# Patient Record
Sex: MALE | Race: BLACK OR AFRICAN AMERICAN
[De-identification: names, ages, dates, MRNs, and addresses within clinical notes are randomized per-mention and may not be internally consistent; named-entity substitution may affect disease eponyms.]

---

## 2019-03-07 PROCEDURE — HZ2ZZZZ DETOXIFICATION SERVICES FOR SUBSTANCE ABUSE TREATMENT: ICD-10-PCS | Performed by: SURGERY

## 2019-03-08 ENCOUNTER — HOSPITAL ENCOUNTER (INPATIENT)
Dept: HOSPITAL 74 - YASAS | Age: 64
LOS: 4 days | Discharge: OTHER INSTITUTION | End: 2019-03-12
Attending: SURGERY | Admitting: SURGERY
Payer: COMMERCIAL

## 2019-03-08 VITALS — BODY MASS INDEX: 29.1 KG/M2

## 2019-03-08 DIAGNOSIS — Z86.19: ICD-10-CM

## 2019-03-08 DIAGNOSIS — F17.210: ICD-10-CM

## 2019-03-08 DIAGNOSIS — I10: ICD-10-CM

## 2019-03-08 DIAGNOSIS — E87.5: ICD-10-CM

## 2019-03-08 DIAGNOSIS — F10.230: Primary | ICD-10-CM

## 2019-03-08 DIAGNOSIS — D72.828: ICD-10-CM

## 2019-03-09 LAB
ALBUMIN SERPL-MCNC: 3.4 G/DL (ref 3.4–5)
ALP SERPL-CCNC: 72 U/L (ref 45–117)
ALT SERPL-CCNC: 20 U/L (ref 13–61)
ANION GAP SERPL CALC-SCNC: 7 MMOL/L (ref 8–16)
AST SERPL-CCNC: 17 U/L (ref 15–37)
BILIRUB SERPL-MCNC: 0.3 MG/DL (ref 0.2–1)
BUN SERPL-MCNC: 24 MG/DL (ref 7–18)
CALCIUM SERPL-MCNC: 8.9 MG/DL (ref 8.5–10.1)
CHLORIDE SERPL-SCNC: 103 MMOL/L (ref 98–107)
CO2 SERPL-SCNC: 27 MMOL/L (ref 21–32)
CREAT SERPL-MCNC: 1.1 MG/DL (ref 0.55–1.3)
DEPRECATED RDW RBC AUTO: 18.3 % (ref 11.9–15.9)
GLUCOSE SERPL-MCNC: 64 MG/DL (ref 74–106)
HCT VFR BLD CALC: 36.3 % (ref 35.4–49)
HGB BLD-MCNC: 12.5 GM/DL (ref 11.7–16.9)
MCH RBC QN AUTO: 24.7 PG (ref 25.7–33.7)
MCHC RBC AUTO-ENTMCNC: 34.4 G/DL (ref 32–35.9)
MCV RBC: 71.8 FL (ref 80–96)
PLATELET # BLD AUTO: 353 K/MM3 (ref 134–434)
PMV BLD: 8 FL (ref 7.5–11.1)
POTASSIUM SERPLBLD-SCNC: 5.5 MMOL/L (ref 3.5–5.1)
PROT SERPL-MCNC: 8.2 G/DL (ref 6.4–8.2)
RBC # BLD AUTO: 5.06 M/MM3 (ref 4–5.6)
SODIUM SERPL-SCNC: 137 MMOL/L (ref 136–145)
WBC # BLD AUTO: 11.7 K/MM3 (ref 4–10)

## 2019-03-09 RX ADMIN — PHENOL PRN EACH: 14.5 LOZENGE ORAL at 13:41

## 2019-03-09 RX ADMIN — Medication SCH TAB: at 10:47

## 2019-03-09 RX ADMIN — NICOTINE SCH: 14 PATCH, EXTENDED RELEASE TRANSDERMAL at 10:48

## 2019-03-09 RX ADMIN — Medication SCH: at 23:06

## 2019-03-09 RX ADMIN — IBUPROFEN PRN MG: 400 TABLET, FILM COATED ORAL at 13:41

## 2019-03-09 NOTE — PN
S CIWA





- CIWA Score


Nausea/Vomitin


Muscle Tremors: None


Anxiety: 2


Agitation: 0-Normal Activity


Paroxysmal Sweats: 3


Orientation: 0-Oriented


Tacttile Disturbances: 3-Moderate Itch/Numb/Burn


Auditory Disturbances: 0-None


Visual Disturbances: 2-Mild Sensitivity


Headache: 0-None Present


CIWA-Ar Total Score: 12





BHS Progress Note (SOAP)


Subjective: 





Anxious, Sweating, Nausea, Fatigue.


Objective: 


PATIENT A & O X 3. IN NO ACUTE DISTRESS.


PATIENT AFEBRILE.





19 14:42


 Vital Signs











Temperature  97.8 F   19 13:45


 


Pulse Rate  56 L  19 13:45


 


Respiratory Rate  18   19 13:45


 


Blood Pressure  98/61   19 13:45


 


O2 Sat by Pulse Oximetry (%)      








 Laboratory Tests











  19





  07:40 07:40


 


WBC  11.7 H 


 


RBC  5.06 


 


Hgb  12.5 


 


Hct  36.3 


 


MCV  71.8 L 


 


MCH  24.7 L 


 


MCHC  34.4 


 


RDW  18.3 H 


 


Plt Count  353  D 


 


MPV  8.0 


 


Sodium   137


 


Potassium   5.5 H


 


Chloride   103


 


Carbon Dioxide   27


 


Anion Gap   7 L


 


BUN   24 H


 


Creatinine   1.1


 


Creat Clearance w eGFR   > 60


 


Random Glucose   64 L


 


Calcium   8.9


 


Total Bilirubin   0.3


 


AST   17


 


ALT   20


 


Alkaline Phosphatase   72


 


Total Protein   8.2


 


Albumin   3.4








LABS NOTED.


RPR RESULT PENDING.





19 14:47





Assessment: 





19 14:47


WITHDRAWAL SYMPTOMS.


HYPERKALEMIA.


LEUKOPCYTOSIS.





Plan: 





CONTINUE DETOX.


INCREASE DAILY PO FLUID INTAKE.


REPEAT CBC LEVEL TOMORROW AM FOR ELEVATED ADMISSION WBC LEVEL.


REPEAT K LEVEL TOMORROW AM FOR ELEVATED ADMISSION LEVEL.

## 2019-03-09 NOTE — HP
CIWA Score


Nausea/Vomitin-Mild Nausea/No Vomiting (hx/o htn with worsening elevated b/

p when withdrawing.presentss with arsen of .076. client unable to stop drinking 

due to withdrawal sx's. cont to drink not to withdraw)


Muscle Tremors: None


Anxiety: 4-Mod. Anxious/Guarded


Agitation: 4-Moderately Restless


Paroxysmal Sweats: No Perspiration


Orientation: 0-Oriented


Tacttile Disturbances: 0-None


Auditory Disturbances: 0-None


Visual Disturbances: 0-None


Headache: 0-None Present


CIWA-Ar Total Score: 9





- Admission Criteria


[facility] Guidelines: Admission for Medically Managed Detox: 


Requires at least one of the followin. CIWA greater than 12


2. Seizures within the past 24 hours


3. Delirium tremens within the past 24 hours


4. Hallucinations within the past 24 hours


5. Acute intervention needed for co  occurring medical disorder


6. Acute intervention needed for co  occurring psychiatric disorder


7. Severe withdrawal that cannot be handled at a lower level of care (continued


    vomiting, continued diarrhea, abnormal vital signs) requiring intravenous


    medication and/or fluids


8. Pregnancy





Patient presents the following: Acute intervention needed for co-occurring med 

or psych disorder (arsen .076 client continues to drink due to withdrawal sx's.)


Admission Criteria Met: Admission criteria met





Admission ROS [facility]





- [facility]


Chief Complaint: 





c/o worsening withdrawal sx's. seeking detox txment.


Allergies/Adverse Reactions: 


 Allergies











Allergy/AdvReac Type Severity Reaction Status Date / Time


 


No Known Allergies Allergy   Verified 19 23:37











History of Present Illness: 





65 y.o. male with hx/o alcoholism here seeking admission to detox. client is 

self referred as he is known to this program. He reports a recent detox at 

[facility] 10 days ago. he states he immediately relapsed after ID. 

drinking 6 pack of beer, 20 oz bottles daily. reports last drink earlier today 

due to worsening withdrawal sx's. He also states he experience very high blood 

pressure when withdrawing. ciwa 09. utox + bzo and hx/o htn. Denies any given 

period of sobriety denies hx/o seizures, si/hi, avh. lives with sibling, ssi- 

denies legals





pmhx= htn


psych- denies 


Exam Limitations: No Limitations





- Ebola screening


Have you traveled outside of the country in the last 21 days: No


Have you had contact with anyone from an Ebola affected area: No


Have you been sick,other than usual withdrawal symptoms: No


Do you have a fever: No





- Review of Systems


Constitutional: Loss of Appetite


EENT: reports: No Symptoms Reported


Respiratory: reports: No Symptoms reported


Cardiac: reports: No Symptoms Reported


GI: reports: Nausea, Poor Fluid Intake


: reports: No Symptoms Reported


Musculoskeletal: reports: No Symptoms Reported


Integumentary: reports: No Symptoms Reported


Neuro: reports: No Symptoms reported


Endocrine: reports: No Symptoms Reported


Hematology: reports: No Symptoms Reported


Psychiatric: reports: No Sypmtoms Reported


Other Systems: Reviewed and Negative





Patient History





- Patient Medical History


Hx Anemia: No


Hx Asthma: No


Hx Chronic Obstructive Pulmonary Disease (COPD): No


Hx Cancer: No


Hx Cardiac Disorders: No


Hx Congestive Heart Failure: No


Hx Hypertension: Yes (norvasc 5 mg)


Hx Hypercholesterolemia: No


Hx Pacemaker: No


HX Cerebrovascular Accident: No


Hx Seizures: No


Hx Dementia: No


Hx Diabetes: No


Hx Gastrointestinal Disorders: No


Hx Liver Disease: No


Hx Genitourinary Disorders: No


Hx Sexually Transmitted Disorders: Yes (GC in )


Hx Renal Disease (ESRD): No


Hx Thyroid Disease: No


Hx Human Immunodeficiency Virus (HIV): No


Hx Hepatitis C: No


Hx Depression: No


Hx Suicide Attempt: No


Hx Bipolar Disorder: No


Hx Schizophrenia: No





- Patient Surgical History


Past Surgical History: No


Hx Neurologic Surgery: No


Hx Cataract Extraction: No


Hx Cardiac Surgery: No


Hx Lung Surgery: No


Hx Breast Surgery: No


Hx Breast Biopsy: No


Hx Abdominal Surgery: No


Hx Appendectomy: No


Hx Cholecystectomy: No


Hx Genitourinary Surgery: No


Hx  Section: No


Hx Orthopedic Surgery: No


Anesthesia Reaction: No





- PPD History


Previous Implant?: Yes


Documented Results: Negative w/proof


Implanted On Prior R Admission?: Yes


Date: 17


Results: 0 mm


PPD to be Administered?: Yes





- Smoking Cessation


Smoking history: Current every day smoker


Have you smoked in the past 12 months: Yes


Aproximately how many cigarettes per day: 20


Cigars Per Day: 0


Hx Chewing Tobacco Use: No


Initiated information on smoking cessation: Yes


'Breaking Loose' booklet given: 19





- Substance & Tx. History


Hx Alcohol Use: Yes


Hx Substance Use: Yes


Substance Use Type: Alcohol


Hx Substance Use Treatment: Yes ([facility])





- Substances Abused


  ** Alcohol


Route: Oral


Frequency: Daily


Amount used: beer- 2 six pack-20 oz 


Age of first use: 20


Date of Last Use: 20





Family Disease History





- Family Disease History


Family Disease History: Diabetes: Brother (drug issues ), Heart Disease: Mother 

(aneurysm,HTN ), Respiratory: Father (emphysema/ALCOHOLIC ), 

Other: Mother, Brother





Admission Physical Exam BHS





- Vital Signs


Vital Signs: 


 Vital Signs - 24 hr











  19





  22:45


 


Temperature 96.8 F L


 


Pulse Rate 62


 


Respiratory 18





Rate 


 


Blood Pressure 130/83














- Physical


General Appearance: Yes: Appropriately Dressed, Mild Distress, Tremorous (felt)

, Irritable, Anxious


HEENTM: Yes: EOMI, Normocephalic, Normal Voice, RAGHU, Pharynx Normal


Respiratory: Yes: Chest Non-Tender, Lungs Clear, Normal Breath Sounds, No 

Respiratory Distress, No Accessory Muscle Use


Neck: Yes: No masses,lesions,Nodules, Supple, Trachea in good position


Breast: Yes: Breast Exam Deferred


Cardiology: Yes: Regular Rhythm, Regular Rate, S1, S2


Abdominal: Yes: Normal Bowel Sounds, Non Tender, Soft, Protuberent


Genitourinary: Yes: Other (no c/o offered)


Back: Yes: Normal Inspection


Musculoskeletal: Yes: full range of Motion, Gait Steady


Extremities: Yes: Normal Capillary Refill, Normal Range of Motion, Non-Tender, 

Tremors (felt)


Neurological: Yes: Fully Oriented, Alert, Motor Strength 5/5


Integumentary: Yes: Dry, Warm


Lymphatic: Yes: Within Normal Limits





- Diagnostic


(1) Alcohol dependence with uncomplicated withdrawal


Current Visit: Yes   Status: Acute   





(2) Essential (primary) hypertension


Current Visit: Yes   Status: Chronic   





(3) Nicotine dependence


Current Visit: Yes   Status: Chronic   


Qualifiers: 


   Nicotine product type: cigarettes   Substance use status: in withdrawal   

Qualified Code(s): F17.213 - Nicotine dependence, cigarettes, with withdrawal   





Cleared for Admission [facility]





- Detox or Rehab


[facility] Level of Care: Medically Managed


Detox Regimen/Protocol: Librium


Claeared for Rehab Admission: No





S Breath Alcohol Content


Breath Alcohol Content: 0.076





Urine Drug Screen





- Results


Drug Screen Negative: No


Urine Drug Screen Results: BZO-Benzodiazepines





Inpatient Rehab Admission





- Rehab Decision to Admit


Inpatient rehab admission?: No

## 2019-03-10 LAB
APPEARANCE UR: CLEAR
BACTERIA #/AREA URNS HPF: (no result) /HPF
BILIRUB UR STRIP.AUTO-MCNC: NEGATIVE MG/DL
COLOR UR: (no result)
KETONES UR QL STRIP: NEGATIVE
LEUKOCYTE ESTERASE UR QL STRIP.AUTO: (no result)
NITRITE UR QL STRIP: NEGATIVE
PH UR: 6 [PH] (ref 5–8)
PROT UR QL STRIP: NEGATIVE
PROT UR QL STRIP: NEGATIVE
SP GR UR: 1.02 (ref 1.01–1.03)
UROBILINOGEN UR STRIP-MCNC: NEGATIVE MG/DL (ref 0.2–1)

## 2019-03-10 RX ADMIN — PHENOL PRN EACH: 14.5 LOZENGE ORAL at 05:43

## 2019-03-10 RX ADMIN — Medication SCH TAB: at 10:22

## 2019-03-10 RX ADMIN — IBUPROFEN PRN MG: 400 TABLET, FILM COATED ORAL at 20:15

## 2019-03-10 RX ADMIN — Medication SCH MG: at 22:30

## 2019-03-10 RX ADMIN — ACETAMINOPHEN PRN MG: 325 TABLET ORAL at 10:23

## 2019-03-10 RX ADMIN — NICOTINE SCH: 14 PATCH, EXTENDED RELEASE TRANSDERMAL at 10:22

## 2019-03-10 RX ADMIN — ACETAMINOPHEN PRN MG: 325 TABLET ORAL at 17:28

## 2019-03-10 NOTE — PN
S CIWA





- CIWA Score


Nausea/Vomitin-No Nausea/No Vomiting


Muscle Tremors: 2


Anxiety: 1-Mildly Anxious


Agitation: 2


Paroxysmal Sweats: 1-Minimal Palms Moist


Orientation: 1-Uncertain about Date


Tacttile Disturbances: 0-None


Auditory Disturbances: 0-None


Visual Disturbances: 0-None


Headache: 1-Very Mild


CIWA-Ar Total Score: 8





BHS Progress Note (SOAP)


Subjective: 





tremor sweating anxiousness trouble concentration


Objective: 





03/10/19 12:03


 Vital Signs











Temperature  97.8 F   03/10/19 09:26


 


Pulse Rate  70   03/10/19 09:26


 


Respiratory Rate  18   03/10/19 09:26


 


Blood Pressure  103/61   03/10/19 09:26


 


O2 Sat by Pulse Oximetry (%)      








 Laboratory Last Values











WBC  11.7 K/mm3 (4.0-10.0)  H  19  07:40    


 


RBC  5.06 M/mm3 (4.00-5.60)   19  07:40    


 


Hgb  12.5 GM/dL (11.7-16.9)   19  07:40    


 


Hct  36.3 % (35.4-49)   19  07:40    


 


MCV  71.8 fl (80-96)  L  19  07:40    


 


MCH  24.7 pg (25.7-33.7)  L  19  07:40    


 


MCHC  34.4 g/dl (32.0-35.9)   19  07:40    


 


RDW  18.3 % (11.9-15.9)  H  19  07:40    


 


Plt Count  353 K/MM3 (134-434)  D 19  07:40    


 


MPV  8.0 fl (7.5-11.1)   19  07:40    


 


Sodium  137 mmol/L (136-145)   19  07:40    


 


Potassium  5.5 mmol/L (3.5-5.1)  H  19  07:40    


 


Chloride  103 mmol/L ()   19  07:40    


 


Carbon Dioxide  27 mmol/L (21-32)   19  07:40    


 


Anion Gap  7 MMOL/L (8-16)  L  19  07:40    


 


BUN  24 mg/dL (7-18)  H  19  07:40    


 


Creatinine  1.1 mg/dL (0.55-1.3)   19  07:40    


 


Creat Clearance w eGFR  > 60  (>60)   19  07:40    


 


Random Glucose  64 mg/dL ()  L  19  07:40    


 


Calcium  8.9 mg/dL (8.5-10.1)   19  07:40    


 


Total Bilirubin  0.3 mg/dL (0.2-1)   19  07:40    


 


AST  17 U/L (15-37)   19  07:40    


 


ALT  20 U/L (13-61)   19  07:40    


 


Alkaline Phosphatase  72 U/L ()   19  07:40    


 


Total Protein  8.2 g/dl (6.4-8.2)   19  07:40    


 


Albumin  3.4 g/dl (3.4-5.0)   19  07:40    


 


Urine Color  Ltyellow   03/10/19  08:00    


 


Urine Appearance  Clear   03/10/19  08:00    


 


Urine pH  6.0  (5.0-8.0)   03/10/19  08:00    


 


Ur Specific Gravity  1.016  (1.010-1.035)   03/10/19  08:00    


 


Urine Protein  Negative  (NEGATIVE)   03/10/19  08:00    


 


Urine Glucose (UA)  Negative  (NEGATIVE)   03/10/19  08:00    


 


Urine Ketones  Negative  (NEGATIVE)   03/10/19  08:00    


 


Urine Blood  Negative  (NEGATIVE)   03/10/19  08:00    


 


Urine Nitrite  Negative  (NEGATIVE)   03/10/19  08:00    


 


Urine Bilirubin  Negative  (<2.0 mg/dL)   03/10/19  08:00    


 


Urine Urobilinogen  Negative mg/dL (0.2-1.0)   03/10/19  08:00    


 


Ur Leukocyte Esterase  2+  (NEGATIVE)  H  03/10/19  08:00    


 


Urine WBC (Auto)  26 /hpf (3-5)   03/10/19  08:00    


 


Urine RBC (Auto)  2 /hpf (0-3)   03/10/19  08:00    


 


Urine Bacteria  Few /hpf (NONE SEEN)   03/10/19  08:00    


 


RPR Titer  Nonreactive  (NONREACTIVE)   19  07:40    








lab noted


repeat K+


03/10/19 12:05





Assessment: 





03/10/19 12:06


withdrawal sx


03/10/19 12:06


K+ elevation 


Plan: 





continue detox


repeat K+

## 2019-03-11 RX ADMIN — NICOTINE SCH: 14 PATCH, EXTENDED RELEASE TRANSDERMAL at 10:18

## 2019-03-11 RX ADMIN — Medication SCH MG: at 22:23

## 2019-03-11 RX ADMIN — Medication SCH TAB: at 10:20

## 2019-03-11 RX ADMIN — IBUPROFEN PRN MG: 400 TABLET, FILM COATED ORAL at 08:37

## 2019-03-11 NOTE — PN
S CIWA





- CIWA Score


Nausea/Vomitin-No Nausea/No Vomiting


Muscle Tremors: 1-None Visible, but Felt


Anxiety: 1-Mildly Anxious


Agitation: 1-Slight > Activity


Paroxysmal Sweats: No Perspiration


Orientation: 0-Oriented


Tacttile Disturbances: 0-None


Auditory Disturbances: 0-None


Visual Disturbances: 0-None


Headache: 1-Very Mild


CIWA-Ar Total Score: 4





BHS Progress Note (SOAP)


Subjective: 





feeling better less tremor sleep better at night less sweating


Objective: 





19 15:07


 Vital Signs











Temperature  98.0 F   19 13:34


 


Pulse Rate  57 L  19 13:34


 


Respiratory Rate  18   19 13:34


 


Blood Pressure  113/74   19 13:34


 


O2 Sat by Pulse Oximetry (%)      








 Laboratory Last Values











WBC  11.7 K/mm3 (4.0-10.0)  H  19  07:40    


 


RBC  5.06 M/mm3 (4.00-5.60)   19  07:40    


 


Hgb  12.5 GM/dL (11.7-16.9)   19  07:40    


 


Hct  36.3 % (35.4-49)   19  07:40    


 


MCV  71.8 fl (80-96)  L  19  07:40    


 


MCH  24.7 pg (25.7-33.7)  L  19  07:40    


 


MCHC  34.4 g/dl (32.0-35.9)   19  07:40    


 


RDW  18.3 % (11.9-15.9)  H  19  07:40    


 


Plt Count  353 K/MM3 (134-434)  D 19  07:40    


 


MPV  8.0 fl (7.5-11.1)   19  07:40    


 


Sodium  137 mmol/L (136-145)   19  07:40    


 


Potassium  5.5 mmol/L (3.5-5.1)  H  19  07:40    


 


Chloride  103 mmol/L ()   19  07:40    


 


Carbon Dioxide  27 mmol/L (21-32)   19  07:40    


 


Anion Gap  7 MMOL/L (8-16)  L  19  07:40    


 


BUN  24 mg/dL (7-18)  H  19  07:40    


 


Creatinine  1.1 mg/dL (0.55-1.3)   19  07:40    


 


Creat Clearance w eGFR  > 60  (>60)   19  07:40    


 


Random Glucose  64 mg/dL ()  L  19  07:40    


 


Calcium  8.9 mg/dL (8.5-10.1)   19  07:40    


 


Total Bilirubin  0.3 mg/dL (0.2-1)   19  07:40    


 


AST  17 U/L (15-37)   19  07:40    


 


ALT  20 U/L (13-61)   19  07:40    


 


Alkaline Phosphatase  72 U/L ()   19  07:40    


 


Total Protein  8.2 g/dl (6.4-8.2)   19  07:40    


 


Albumin  3.4 g/dl (3.4-5.0)   19  07:40    


 


Urine Color  Ltyellow   03/10/19  08:00    


 


Urine Appearance  Clear   03/10/19  08:00    


 


Urine pH  6.0  (5.0-8.0)   03/10/19  08:00    


 


Ur Specific Gravity  1.016  (1.010-1.035)   03/10/19  08:00    


 


Urine Protein  Negative  (NEGATIVE)   03/10/19  08:00    


 


Urine Glucose (UA)  Negative  (NEGATIVE)   03/10/19  08:00    


 


Urine Ketones  Negative  (NEGATIVE)   03/10/19  08:00    


 


Urine Blood  Negative  (NEGATIVE)   03/10/19  08:00    


 


Urine Nitrite  Negative  (NEGATIVE)   03/10/19  08:00    


 


Urine Bilirubin  Negative  (<2.0 mg/dL)   03/10/19  08:00    


 


Urine Urobilinogen  Negative mg/dL (0.2-1.0)   03/10/19  08:00    


 


Ur Leukocyte Esterase  2+  (NEGATIVE)  H  03/10/19  08:00    


 


Urine WBC (Auto)  26 /hpf (3-5)   03/10/19  08:00    


 


Urine RBC (Auto)  2 /hpf (0-3)   03/10/19  08:00    


 


Urine Bacteria  Few /hpf (NONE SEEN)   03/10/19  08:00    


 


RPR Titer  Nonreactive  (NONREACTIVE)   19  07:40    











19 15:08


lab noted


hyper kalemia 


repeat K+


Assessment: 





19 15:09


withdrawal sx


Plan: 





continue detox

## 2019-03-12 ENCOUNTER — HOSPITAL ENCOUNTER (INPATIENT)
Dept: HOSPITAL 74 - YASAS | Age: 64
LOS: 13 days | Discharge: HOME | DRG: 772 | End: 2019-03-25
Attending: NEUROMUSCULOSKELETAL MEDICINE & OMM | Admitting: NEUROMUSCULOSKELETAL MEDICINE & OMM
Payer: COMMERCIAL

## 2019-03-12 VITALS — TEMPERATURE: 96.8 F | SYSTOLIC BLOOD PRESSURE: 111 MMHG | HEART RATE: 66 BPM | DIASTOLIC BLOOD PRESSURE: 66 MMHG

## 2019-03-12 DIAGNOSIS — F10.20: Primary | ICD-10-CM

## 2019-03-12 DIAGNOSIS — F14.20: ICD-10-CM

## 2019-03-12 LAB
APPEARANCE UR: CLEAR
BILIRUB UR STRIP.AUTO-MCNC: NEGATIVE MG/DL
COLOR UR: YELLOW
KETONES UR QL STRIP: NEGATIVE
LEUKOCYTE ESTERASE UR QL STRIP.AUTO: (no result)
NITRITE UR QL STRIP: NEGATIVE
PH UR: 5 [PH] (ref 5–8)
PROT UR QL STRIP: NEGATIVE
PROT UR QL STRIP: NEGATIVE
SP GR UR: 1.03 (ref 1.01–1.03)
UROBILINOGEN UR STRIP-MCNC: NEGATIVE MG/DL (ref 0.2–1)

## 2019-03-12 PROCEDURE — HZ42ZZZ GROUP COUNSELING FOR SUBSTANCE ABUSE TREATMENT, COGNITIVE-BEHAVIORAL: ICD-10-PCS | Performed by: NEUROMUSCULOSKELETAL MEDICINE & OMM

## 2019-03-12 RX ADMIN — NICOTINE SCH: 14 PATCH, EXTENDED RELEASE TRANSDERMAL at 10:04

## 2019-03-12 RX ADMIN — IBUPROFEN PRN MG: 400 TABLET, FILM COATED ORAL at 05:46

## 2019-03-12 RX ADMIN — ACETAMINOPHEN PRN MG: 325 TABLET ORAL at 09:01

## 2019-03-12 RX ADMIN — Medication SCH TAB: at 10:04

## 2019-03-12 RX ADMIN — Medication SCH: at 21:45

## 2019-03-12 NOTE — HP
BHS MD Rehab Assess/Revision





- Admission History


Ad[facility] to Rehab from[facility]


Date of Admission to Rehab: 03/12/19





- Vital signs


Vital Signs: 


 Vital Signs











 Period  Temp  Pulse  Resp  BP Sys/Russ  Pulse Ox


 


 Last 24 Hr  97.9 F  87  18  126/70  














- Findings


Detox History & Physical reviewed: Yes


Concur with findings: Yes


Comments/Additional Findings: transferred from detox to rehab admission as per 

protocol





Inpatient Rehab Admission





- Rehab Decision to Admit


Inpatient rehab admission?: Yes





- Initial Determination


Are CD services needed?: Yes


Free of communicable disease: Yes


Not in need of hospitalization: Yes





- Rehab Admission Criteria


Previous failed treatment: Yes


Poor recovery environment: Yes


Comorbidities: Yes


Lacks judgement: No


Patient is meeting Inpatient Rehab admission criteria:: Yes

## 2019-03-13 RX ADMIN — Medication PRN MG: at 21:51

## 2019-03-13 RX ADMIN — PHENOL PRN EACH: 14.5 LOZENGE ORAL at 06:55

## 2019-03-13 RX ADMIN — GUAIFENESIN PRN ML: 100 SOLUTION ORAL at 06:59

## 2019-03-13 RX ADMIN — GUAIFENESIN PRN ML: 100 SOLUTION ORAL at 16:28

## 2019-03-13 RX ADMIN — Medication SCH TAB: at 10:34

## 2019-03-13 RX ADMIN — IBUPROFEN PRN MG: 400 TABLET, FILM COATED ORAL at 15:18

## 2019-03-13 RX ADMIN — Medication SCH MG: at 21:51

## 2019-03-14 RX ADMIN — Medication SCH MG: at 21:02

## 2019-03-14 RX ADMIN — PHENOL PRN EACH: 14.5 LOZENGE ORAL at 09:54

## 2019-03-14 RX ADMIN — Medication PRN MG: at 21:02

## 2019-03-14 RX ADMIN — GUAIFENESIN PRN ML: 100 SOLUTION ORAL at 09:54

## 2019-03-14 RX ADMIN — Medication SCH TAB: at 09:53

## 2019-03-15 RX ADMIN — Medication PRN MG: at 21:19

## 2019-03-15 RX ADMIN — PHENOL PRN EACH: 14.5 LOZENGE ORAL at 21:20

## 2019-03-15 RX ADMIN — IBUPROFEN PRN MG: 400 TABLET, FILM COATED ORAL at 14:31

## 2019-03-15 RX ADMIN — Medication SCH MG: at 21:19

## 2019-03-15 RX ADMIN — GUAIFENESIN PRN ML: 100 SOLUTION ORAL at 21:19

## 2019-03-15 RX ADMIN — Medication SCH TAB: at 10:19

## 2019-03-15 RX ADMIN — PHENOL PRN EACH: 14.5 LOZENGE ORAL at 14:29

## 2019-03-16 RX ADMIN — GUAIFENESIN PRN ML: 100 SOLUTION ORAL at 15:12

## 2019-03-16 RX ADMIN — Medication PRN MG: at 21:53

## 2019-03-16 RX ADMIN — PHENOL PRN EACH: 14.5 LOZENGE ORAL at 15:12

## 2019-03-16 RX ADMIN — Medication SCH MG: at 21:53

## 2019-03-16 RX ADMIN — Medication SCH TAB: at 09:53

## 2019-03-16 RX ADMIN — GUAIFENESIN PRN ML: 100 SOLUTION ORAL at 21:59

## 2019-03-17 RX ADMIN — ACETAMINOPHEN PRN MG: 325 TABLET ORAL at 11:04

## 2019-03-17 RX ADMIN — ACETAMINOPHEN PRN MG: 325 TABLET ORAL at 17:35

## 2019-03-17 RX ADMIN — GUAIFENESIN PRN ML: 100 SOLUTION ORAL at 06:38

## 2019-03-17 RX ADMIN — PHENOL PRN EACH: 14.5 LOZENGE ORAL at 06:37

## 2019-03-17 RX ADMIN — Medication SCH: at 22:48

## 2019-03-17 RX ADMIN — Medication SCH TAB: at 10:19

## 2019-03-18 RX ADMIN — Medication SCH TAB: at 10:27

## 2019-03-18 RX ADMIN — Medication PRN MG: at 21:34

## 2019-03-18 RX ADMIN — Medication SCH MG: at 21:34

## 2019-03-19 RX ADMIN — IBUPROFEN PRN MG: 400 TABLET, FILM COATED ORAL at 17:02

## 2019-03-19 RX ADMIN — GUAIFENESIN PRN ML: 100 SOLUTION ORAL at 17:03

## 2019-03-19 RX ADMIN — Medication SCH MG: at 21:20

## 2019-03-19 RX ADMIN — Medication SCH TAB: at 10:48

## 2019-03-19 RX ADMIN — Medication PRN MG: at 21:20

## 2019-03-20 RX ADMIN — PHENOL PRN EACH: 14.5 LOZENGE ORAL at 11:34

## 2019-03-20 RX ADMIN — Medication SCH TAB: at 10:16

## 2019-03-20 RX ADMIN — Medication PRN MG: at 21:35

## 2019-03-20 RX ADMIN — Medication SCH MG: at 21:35

## 2019-03-20 RX ADMIN — GUAIFENESIN PRN ML: 100 SOLUTION ORAL at 11:35

## 2019-03-20 RX ADMIN — IBUPROFEN PRN MG: 400 TABLET, FILM COATED ORAL at 11:34

## 2019-03-21 RX ADMIN — Medication PRN MG: at 21:26

## 2019-03-21 RX ADMIN — Medication SCH TAB: at 09:46

## 2019-03-21 RX ADMIN — Medication SCH MG: at 21:26

## 2019-03-22 RX ADMIN — Medication SCH TAB: at 09:39

## 2019-03-22 RX ADMIN — Medication SCH MG: at 21:25

## 2019-03-22 RX ADMIN — IBUPROFEN PRN MG: 400 TABLET, FILM COATED ORAL at 10:01

## 2019-03-22 RX ADMIN — Medication PRN MG: at 21:25

## 2019-03-23 RX ADMIN — Medication PRN MG: at 21:11

## 2019-03-23 RX ADMIN — GUAIFENESIN PRN ML: 100 SOLUTION ORAL at 21:12

## 2019-03-23 RX ADMIN — Medication SCH TAB: at 09:49

## 2019-03-23 RX ADMIN — Medication SCH MG: at 21:11

## 2019-03-23 RX ADMIN — PHENOL PRN EACH: 14.5 LOZENGE ORAL at 21:12

## 2019-03-24 RX ADMIN — Medication SCH TAB: at 09:56

## 2019-03-24 RX ADMIN — Medication SCH MG: at 21:03

## 2019-03-24 RX ADMIN — Medication PRN MG: at 21:03

## 2019-03-25 VITALS — SYSTOLIC BLOOD PRESSURE: 145 MMHG | HEART RATE: 72 BPM | DIASTOLIC BLOOD PRESSURE: 97 MMHG | TEMPERATURE: 97.8 F

## 2019-03-25 RX ADMIN — Medication SCH TAB: at 09:13

## 2019-03-25 NOTE — PN
BHS Progress Note


Note: 





REHAB DISCHARGE NOTE:





PATIENT COMPLETED REHAB TODAY AND REPORTS PERSONAL SATISFACTION WITH PROGRAM 

AND ACCOMPLISHMENT OF ALL REHAB GOALS. PATIENT IS MEDICALLY STABLE AND DENIES SI

/HI.  AFTERCARE ARRANGED AT [facility] FOR 3/26/19 AT 9AM. PATIENT ENCOURAGED 

TO CONTINUE WITH AFTERCARE PROGRAM AND GROUP MEETINGS TO PREVENT RELAPSE AND TO 

FOLLOW UP WITH PCP WITHIN ONE WEEK OF DISCHARGE TO CONTINUE MEDICAL MANAGEMENT.


 Vital Signs











Temperature  97.8 F   03/25/19 07:10


 


Pulse Rate  72   03/25/19 07:10


 


Respiratory Rate  18   03/25/19 07:10


 


Blood Pressure  145/97   03/25/19 07:10


 


O2 Sat by Pulse Oximetry (%)      








Ambulatory Orders





Ferrous Sulfate [Feosol] 325 mg PO DAILY #30 tablet 07/29/17

## 2020-07-25 ENCOUNTER — EMERGENCY (EMERGENCY)
Facility: HOSPITAL | Age: 65
LOS: 1 days | Discharge: HOME | End: 2020-07-25
Admitting: EMERGENCY MEDICINE
Payer: MEDICAID

## 2020-07-25 VITALS
TEMPERATURE: 98 F | HEART RATE: 108 BPM | DIASTOLIC BLOOD PRESSURE: 74 MMHG | RESPIRATION RATE: 18 BRPM | SYSTOLIC BLOOD PRESSURE: 135 MMHG | WEIGHT: 179.9 LBS | OXYGEN SATURATION: 94 %

## 2020-07-25 DIAGNOSIS — F10.129 ALCOHOL ABUSE WITH INTOXICATION, UNSPECIFIED: ICD-10-CM

## 2020-07-25 LAB
ETHANOL SERPL-MCNC: <3 MG/DL — SIGNIFICANT CHANGE UP
GLUCOSE BLDC GLUCOMTR-MCNC: 106 MG/DL — HIGH (ref 70–99)

## 2020-07-25 PROCEDURE — 70450 CT HEAD/BRAIN W/O DYE: CPT | Mod: 26

## 2020-07-25 PROCEDURE — 99284 EMERGENCY DEPT VISIT MOD MDM: CPT

## 2020-07-25 NOTE — ED PROVIDER NOTE - OBJECTIVE STATEMENT
65 yo M BIBA for alcohol intoxication. Pt admits to drinking alcohol today.  Denies drug use or other illicits. Pt has no acute medical complaints or apparent trauma noted. No bleeding, respiratory distress, pain, vomiting, or urinary/bowel incontinence noted.

## 2020-07-25 NOTE — ED PROVIDER NOTE - PHYSICAL EXAMINATION
General: drowsy, arousable to touch, smells of alcohol  Head: NCAT  Eyes: PERRL  Heart: RRR  Lungs: CTAB  Abd: soft, NTND  Neuro: moves all 4 extremities equally  Skin: no e/o lacerations, abrasions, or ecchymoses

## 2020-07-25 NOTE — ED PROVIDER NOTE - CLINICAL SUMMARY MEDICAL DECISION MAKING FREE TEXT BOX
Pt presents to the ED w/ Alcohol intoxication, no signs of trauma, not hypoglycemic, neuro exam non-focal, will observe and reassess

## 2020-07-25 NOTE — ED PROVIDER NOTE - PATIENT PORTAL LINK FT
You can access the FollowMyHealth Patient Portal offered by [facility] by registering at the following website: [email or web address]. By joining [employer]’s FollowMyHealth portal, you will also be able to view your health information using other applications (apps) compatible with our system.

## 2020-07-25 NOTE — ED ADULT NURSE NOTE - CHPI ED NUR SYMPTOMS NEG
no decreased eating/drinking/no pain/no tingling/no chills/no dizziness/no fever/no nausea/no weakness/no vomiting

## 2020-07-25 NOTE — ED ADULT TRIAGE NOTE - CHIEF COMPLAINT QUOTE
pt was found sleeping on the bench, is a+Ox3- presents with no medical complaints- denies drinking or drugs-

## 2021-07-14 ENCOUNTER — EMERGENCY (EMERGENCY)
Facility: HOSPITAL | Age: 66
LOS: 1 days | Discharge: HOME | End: 2021-07-14
Attending: EMERGENCY MEDICINE | Admitting: EMERGENCY MEDICINE
Payer: MEDICAID

## 2021-07-14 VITALS
HEART RATE: 77 BPM | TEMPERATURE: 98 F | SYSTOLIC BLOOD PRESSURE: 115 MMHG | DIASTOLIC BLOOD PRESSURE: 75 MMHG | OXYGEN SATURATION: 96 % | RESPIRATION RATE: 20 BRPM

## 2021-07-14 PROCEDURE — 99284 EMERGENCY DEPT VISIT MOD MDM: CPT

## 2021-07-14 NOTE — ED PROVIDER NOTE - PATIENT PORTAL LINK FT
You can access the FollowMyHealth Patient Portal offered by [facility] by registering at the following website: [email or web address]. By joining [employer]’s FollowMyHealth portal, you will also be able to view your health information using other applications (apps) compatible with our system.

## 2021-07-14 NOTE — ED PROVIDER NOTE - PROGRESS NOTE DETAILS
Antonella Logan PGY-2 patient berating staff. requesting food. disruptive. will continue to eval. Antonella Logan PGY-2 patient ate and asleep. Antonella Logan PGY-2 patient reassessed. cannot stand without falling.  will continue to monitor. Antonella Logan PGY-2 patient clinically sober. ambulating. will discharge home. states he will take train home. given detox resources.

## 2021-07-14 NOTE — ED PROVIDER NOTE - OBJECTIVE STATEMENT
64 y/o M unknown medical hx, intoxicated, aaox0 found in street. no signs of trauma. resting in emergency department. arousals and agitated.

## 2021-07-14 NOTE — ED PROVIDER NOTE - NSFOLLOWUPINSTRUCTIONS_ED_ALL_ED_FT
Abuse of Alcohol    WHAT YOU NEED TO KNOW:    Alcohol abuse means you drink more than the recommended daily or weekly limits. You may be drinking alcohol regularly or drinking large amounts in a short period of time (binge drinking). You continue to drink even though it causes legal, work, or relationship problems.    DISCHARGE INSTRUCTIONS:    Call your local emergency number (911 in the ) for any of the following:   •You have sudden chest pain or trouble breathing.      •You want to harm yourself or others.      •You have a seizure or have shaking or trembling.      Call your doctor if:   •You have hallucinations (you see or hear things that are not real).      •You cannot stop vomiting or you vomit blood.      •You need help to stop drinking alcohol.       •You have questions or concerns about your condition or care.      Medicines:   •Vitamin supplements may be given to treat low vitamin levels. Alcohol can make it hard for your body to absorb enough vitamins such as B1. Vitamin supplements may also be given to prevent alcohol related brain damage.       •Take your medicine as directed. Contact your healthcare provider if you think your medicine is not helping or if you have side effects. Tell him or her if you are allergic to any medicine. Keep a list of the medicines, vitamins, and herbs you take. Include the amounts, and when and why you take them. Bring the list or the pill bottles to follow-up visits. Carry your medicine list with you in case of an emergency.      Health problems alcohol abuse can cause:   •Cancer in your liver, pancreas, stomach, colon, kidney, or breast      •Stroke or a heart attack      •Liver, kidney, or lung disease      •Blackouts, memory loss, brain damage, or dementia      •Diabetes, immune system problems, or thiamine (vitamin B1) deficiency      •Problems for you and your baby if you drink while pregnant      Recommended alcohol limits:   •Men 21 to 64 years should limit alcohol to 2 drinks a day. Do not have more than 4 drinks in 1 day or more than 14 in 1 week.      •All women, and men 65 or older should limit alcohol to 1 drink in a day. Do not have more than 3 drinks in 1 day or more than 7 in 1 week. No amount of alcohol is okay during pregnancy.      Manage alcohol use:   •Decrease the amount you drink. This can help prevent health problems such as brain, heart, and liver damage, high blood pressure, diabetes, and cancer. If you cannot stop completely, healthcare providers can help you set goals to decrease the amount you drink.      •Plan weekly alcohol use. You will be less likely to drink more than the recommended limit if you plan ahead.      •Have food when you drink alcohol. Food will prevent alcohol from getting into your system too quickly. Eat before you have your first alcohol drink.      •Time your drinks carefully. Have no more than 1 drink in an hour. Have a liquid such as water, coffee, or a soft drink between alcohol drinks.      •Do not drive if you have had alcohol. Make sure someone who has not been drinking can help you get home.      •Do not drink alcohol if you are taking medicine. Alcohol is dangerous when you combine it with certain medicines, such as acetaminophen or blood pressure medicine. Talk to your healthcare provider about all the medicines you currently take.      Follow up with your healthcare provider as directed: Write down your questions so you remember to ask them during your visits.    For support and more information:   •Alcoholics Anonymous  Web Address: http://www.aa.org      •Substance Abuse and Mental Health Services Administration  [address]  [place] [address]  Web Address: http://www.samhsa.gov

## 2021-07-14 NOTE — ED PROVIDER NOTE - PHYSICAL EXAMINATION
Vital signs reviewed  GENERAL: intoxicated  HEAD: NCAT, no signs of any abrasions or contusions  EYES: Anicteric, perrla eomi  ENT: MMM  NECK: Supple, non tender  RESPIRATORY: Normal respiratory effort.  CARDIOVASCULAR: Regular rate and rhythm  ABDOMEN: Soft. Nondistended. Nontender.   MUSCULOSKELETAL/EXTREMITIES: Brisk cap refill. 2+ radial pulses. No leg edema.  SKIN:  Warm and dry  NEURO: AAOx0.

## 2021-07-14 NOTE — ED PROVIDER NOTE - CLINICAL SUMMARY MEDICAL DECISION MAKING FREE TEXT BOX
64 y/o M unknown medical hx, intoxicated, aaox0 found in street. no signs of trauma. resting in emergency department. arousals and agitated.  no signs of trauma. lungs cta. abd soft ntnd. will continue to monitor and discharge once clinically sober.

## 2021-07-14 NOTE — ED PROVIDER NOTE - ATTENDING CONTRIBUTION TO CARE
66 y/o M unknown medical hx, intoxicated, aaox0 found in street. no signs of trauma. resting in emergency department. arousals and agitated.

## 2021-07-14 NOTE — ED ADULT NURSE NOTE - OBJECTIVE STATEMENT
Patient received in room 26A for etoh intox. Patient A&OX3, reports he drank 2 - 16oz beers. Denies drinking everyday. Patient reports he drank after his mom passed away 1 week ago. Patient received undressed into hospital gown. Patient screaming for food in stretcher. Selectively answering questions on assessment and yelling at staff to remove BP cuff off arm when obtaining vitals. Awaiting MD evaluation.

## 2021-07-14 NOTE — ED ADULT TRIAGE NOTE - CHIEF COMPLAINT QUOTE
Pt found sleeping on street covered in feces, pt cursing admits to ETOH, asking if there are detox beds

## 2021-07-15 VITALS
RESPIRATION RATE: 16 BRPM | HEART RATE: 73 BPM | DIASTOLIC BLOOD PRESSURE: 78 MMHG | OXYGEN SATURATION: 100 % | TEMPERATURE: 98 F | SYSTOLIC BLOOD PRESSURE: 112 MMHG

## 2021-07-15 RX ORDER — THIAMINE MONONITRATE (VIT B1) 100 MG
100 TABLET ORAL ONCE
Refills: 0 | Status: COMPLETED | OUTPATIENT
Start: 2021-07-15 | End: 2021-07-15

## 2021-07-15 RX ORDER — FOLIC ACID 0.8 MG
1 TABLET ORAL ONCE
Refills: 0 | Status: COMPLETED | OUTPATIENT
Start: 2021-07-15 | End: 2021-07-15

## 2021-07-15 RX ADMIN — Medication 100 MILLIGRAM(S): at 04:37

## 2021-07-15 RX ADMIN — Medication 1 MILLIGRAM(S): at 04:46

## 2021-07-15 RX ADMIN — Medication 25 MILLIGRAM(S): at 04:37

## 2021-07-15 RX ADMIN — Medication 1 TABLET(S): at 04:37

## 2021-07-15 NOTE — PROVIDER CONTACT NOTE (OTHER) - ASSESSMENT
SW discussed detox resources and provided referral information for [facility], [facility], [facility]

## 2021-07-15 NOTE — ED ADULT NURSE REASSESSMENT NOTE - NS ED NURSE REASSESS COMMENT FT1
Patient ambulated with steady gait, awake and alert. Per MD, patient to be discharged. SW provided information for detox. Patient given [facility] card as requested.

## 2021-09-27 NOTE — ED PROVIDER NOTE - NS_ACPWITHSCRIBE_ED_ALL_ED
no
"I personally performed the services described in the documentation  recorded by the scribe in my presence, and it accurately and completely records my words and action.”